# Patient Record
Sex: FEMALE | Race: WHITE | Employment: UNEMPLOYED | URBAN - NONMETROPOLITAN AREA
[De-identification: names, ages, dates, MRNs, and addresses within clinical notes are randomized per-mention and may not be internally consistent; named-entity substitution may affect disease eponyms.]

---

## 2017-10-24 ENCOUNTER — APPOINTMENT (OUTPATIENT)
Dept: GENERAL RADIOLOGY | Age: 57
End: 2017-10-24
Payer: COMMERCIAL

## 2017-10-24 ENCOUNTER — HOSPITAL ENCOUNTER (EMERGENCY)
Age: 57
Discharge: HOME | End: 2017-10-24
Payer: COMMERCIAL

## 2017-10-24 VITALS
WEIGHT: 195 LBS | HEART RATE: 72 BPM | RESPIRATION RATE: 16 BRPM | BODY MASS INDEX: 32.49 KG/M2 | OXYGEN SATURATION: 97 % | SYSTOLIC BLOOD PRESSURE: 135 MMHG | TEMPERATURE: 97.8 F | HEIGHT: 65 IN | DIASTOLIC BLOOD PRESSURE: 70 MMHG

## 2017-10-24 DIAGNOSIS — M25.552 ACUTE PAIN OF LEFT HIP: Primary | ICD-10-CM

## 2017-10-24 PROCEDURE — 99283 EMERGENCY DEPT VISIT LOW MDM: CPT | Performed by: NURSE PRACTITIONER

## 2017-10-24 PROCEDURE — 96372 THER/PROPH/DIAG INJ SC/IM: CPT

## 2017-10-24 PROCEDURE — 6360000002 HC RX W HCPCS: Performed by: NURSE PRACTITIONER

## 2017-10-24 PROCEDURE — 73502 X-RAY EXAM HIP UNI 2-3 VIEWS: CPT

## 2017-10-24 PROCEDURE — 99283 EMERGENCY DEPT VISIT LOW MDM: CPT

## 2017-10-24 RX ORDER — MORPHINE SULFATE 4 MG/ML
4 INJECTION, SOLUTION INTRAMUSCULAR; INTRAVENOUS ONCE
Status: COMPLETED | OUTPATIENT
Start: 2017-10-24 | End: 2017-10-24

## 2017-10-24 RX ORDER — OXYCODONE HYDROCHLORIDE AND ACETAMINOPHEN 5; 325 MG/1; MG/1
1 TABLET ORAL 2 TIMES DAILY PRN
COMMUNITY

## 2017-10-24 RX ORDER — ONDANSETRON 2 MG/ML
4 INJECTION INTRAMUSCULAR; INTRAVENOUS ONCE
Status: COMPLETED | OUTPATIENT
Start: 2017-10-24 | End: 2017-10-24

## 2017-10-24 RX ORDER — KETOROLAC TROMETHAMINE 30 MG/ML
60 INJECTION, SOLUTION INTRAMUSCULAR; INTRAVENOUS ONCE
Status: COMPLETED | OUTPATIENT
Start: 2017-10-24 | End: 2017-10-24

## 2017-10-24 RX ORDER — DULOXETIN HYDROCHLORIDE 60 MG/1
120 CAPSULE, DELAYED RELEASE ORAL NIGHTLY
COMMUNITY

## 2017-10-24 RX ORDER — CYCLOBENZAPRINE HCL 10 MG
10 TABLET ORAL 3 TIMES DAILY PRN
Qty: 30 TABLET | Refills: 0 | Status: SHIPPED | OUTPATIENT
Start: 2017-10-24 | End: 2017-11-03

## 2017-10-24 RX ORDER — ORPHENADRINE CITRATE 30 MG/ML
60 INJECTION INTRAMUSCULAR; INTRAVENOUS ONCE
Status: COMPLETED | OUTPATIENT
Start: 2017-10-24 | End: 2017-10-24

## 2017-10-24 RX ORDER — NAPROXEN 500 MG/1
500 TABLET ORAL 2 TIMES DAILY
Qty: 20 TABLET | Refills: 0 | Status: SHIPPED | OUTPATIENT
Start: 2017-10-24

## 2017-10-24 RX ADMIN — ORPHENADRINE CITRATE 60 MG: 30 INJECTION INTRAMUSCULAR; INTRAVENOUS at 17:24

## 2017-10-24 RX ADMIN — KETOROLAC TROMETHAMINE 60 MG: 30 INJECTION, SOLUTION INTRAMUSCULAR at 17:24

## 2017-10-24 RX ADMIN — ONDANSETRON 4 MG: 2 INJECTION INTRAMUSCULAR; INTRAVENOUS at 17:24

## 2017-10-24 RX ADMIN — MORPHINE SULFATE 4 MG: 4 INJECTION, SOLUTION INTRAMUSCULAR; INTRAVENOUS at 17:27

## 2017-10-24 ASSESSMENT — PAIN SCALES - GENERAL
PAINLEVEL_OUTOF10: 10
PAINLEVEL_OUTOF10: 10
PAINLEVEL_OUTOF10: 0
PAINLEVEL_OUTOF10: 10

## 2017-10-24 ASSESSMENT — PAIN DESCRIPTION - PAIN TYPE: TYPE: ACUTE PAIN

## 2017-10-24 ASSESSMENT — PAIN DESCRIPTION - ORIENTATION: ORIENTATION: LEFT

## 2017-10-24 ASSESSMENT — PAIN DESCRIPTION - LOCATION: LOCATION: HIP

## 2017-10-24 NOTE — ED PROVIDER NOTES
[facility] [place] [facility] EMERGENCY DEPT  eMERGENCY dEPARTMENT eNCOUnter      Pt Name: Angel Luis Ozuna  MRN: [id number]  [place] 1960  Date of evaluation: 10/24/2017  Provider: THANH Juárez    CHIEF COMPLAINT       Chief Complaint   Patient presents with    Hip Pain     Left hip pain - no known injury - pain started Saturday         HISTORY OF PRESENT ILLNESS  (Location/Symptom, Timing/Onset, Context/Setting, Quality, Duration, Modifying Factors, Severity.)   Angel Luis Ozuna is a 56 y.o. female who presents to the emergency department With chief complaint of left hip pain that began Saturday and the patient was eating out of her truck. She states she noticed hip pain when she abducted her left leg. Patient denies any specific injury or trauma other than this activity. Patient states she has degenerative disc disease and she follows up with Dr. Chino Sullivan and she has had previous surgery and she receives injections in her spine. She states she tried to get into the orthopedic institute however she was unable to get through the telephone answering service. She denies any alleviating factors. She states she is prescribed Percocet when necessary however she did not know if this would help her hip. The history is provided by the patient and the spouse. Nursing Notes were reviewed and I agree. REVIEW OF SYSTEMS    (2-9 systems for level 4, 10 or more for level 5)     Review of Systems   Constitutional: Negative for chills and fever. Musculoskeletal: Positive for arthralgias. Negative for gait problem. Skin: Negative for rash. Psychiatric/Behavioral: Behavioral problem: left hip pain. Except as noted above the remainder of the review of systems was reviewed and negative.        PAST MEDICAL HISTORY     Past Medical History:   Diagnosis Date    DDD (degenerative disc disease), lumbar 4/12/2016    Diabetes mellitus ([facility] [address]     GERD (gastroesophageal reflux disease)     Hyperlipidemia     Hypertension     Other spondylosis with radiculopathy, lumbar region 4/12/2016    Radiculopathy, lumbosacral region          SURGICAL HISTORY       Past Surgical History:   Procedure Laterality Date    CARPAL TUNNEL RELEASE Bilateral     CYST REMOVAL Left     DILATION AND CURETTAGE OF UTERUS      X 2    KNEE SURGERY Right     EXPLORATORY    LUMBAR FUSION Left 4/12/2016    L4-5 LUMBAR INTERBODY FUS[facility] [address] [facility] N/A 4/26/2016    L4-5 POSTERIOR SPINAL FUSION WITH INSTRUMENTATION WITH LAMINECTOMY  performed by Dany Casey MD at [address]       Discharge Medication List as of 10/24/2017  5:55 PM      CONTINUE these medications which have NOT CHANGED    Details   DULoxetine (CYMBALTA) 60 MG extended release capsule Take 120 mg by mouth nightlyHistorical Med      oxyCODONE-acetaminophen (PERCOCET) 5-325 MG per tablet Take 1 tablet by mouth 2 times daily as needed for Pain . Historical Med      lidocaine (LIDODERM) Place 1 patch onto the skin nightlyHistorical Med      metFORMIN ER (GLUCOPHAGE-XR) 500 MG XR tablet Take 1,000 mg by mouth 2 times daily      Exenatide 2 MG PEN Inject 2 mg into the skin every 7 days      losartan (COZAAR) 50 MG tablet Take 100 mg by mouth daily      atenolol (TENORMIN) 25 MG tablet Take 25 mg by mouth daily      atorvastatin (LIPITOR) 20 MG tablet Take 20 mg by mouth nightly      traZODone (DESYREL) 50 MG tablet Take 50 mg by mouth nightly CAN TAKE UP  MG AT HS      pregabalin (LYRICA) 50 MG capsule Take 300 mg by mouth 2 times daily Historical Med             ALLERGIES     Lisinopril and Erythromycin    FAMILY HISTORY       Family History   Problem Relation Age of Onset    Cancer Mother      LUNG CA    Cancer Father      LUNG CA    Heart Disease Father     Cancer Sister      LUNG CA    Other Brother      ETOH ABUSE          SOCIAL HISTORY       Social History     Social History    Marital status:      Spouse name: N/A    Number of children: N/A    Years of education: N/A     Social History Main Topics    Smoking status: Never Smoker    Smokeless tobacco: Never Used    Alcohol use No    Drug use: No    Sexual activity: Not Asked     Other Topics Concern    None     Social History Narrative    None       SCREENINGS           PHYSICAL EXAM    (up to 7 for level 4, 8 or more for level 5)     ED Triage Vitals   BP Temp Temp Source Pulse Resp SpO2 Height Weight   10/24/17 1643 10/24/17 1643 10/24/17 1643 10/24/17 1643 10/24/17 1643 10/24/17 1643 10/24/17 1636 10/24/17 1636   113/70 98 °F (36.7 °C) Oral 71 18 99 % 5' 5\" (1.651 m) 195 lb (88.5 kg)       Physical Exam   Constitutional: She is oriented to person, place, and time. She appears well-developed and well-nourished. No distress. HENT:   Head: Normocephalic and atraumatic. Musculoskeletal: Normal range of motion. She exhibits tenderness. She exhibits no edema or deformity. Legs:  Neurological: She is alert and oriented to person, place, and time. No cranial nerve deficit. Coordination normal.   Skin: Skin is warm and dry. She is not diaphoretic. Psychiatric: She has a normal mood and affect. Nursing note and vitals reviewed. DIAGNOSTIC RESULTS     RADIOLOGY:   Non-plain film images such as CT, Ultrasound and MRI are read by the radiologist. Plain radiographic images are visualized and preliminarily interpreted by No att. providers found with the below findings:        Interpretation per the Radiologist below, if available at the time of this note:    XR HIP 2-3 VW W PELVIS LEFT   Final Result   1. No acute bony abnormality. 2. Degenerative changes. Signed by Dr Bhargavi Guido on 10/24/2017 5:22 PM          LABS:  Labs Reviewed - No data to display    All other labs were within normal range or not returned as of this dictation.     RE-ASSESSMENT          EMERGENCY DEPARTMENT COURSE and DIFFERENTIAL DIAGNOSIS/MDM: Vitals:    Vitals:    10/24/17 1636 10/24/17 1643 10/24/17 1806   BP:  113/70 135/70   Pulse:  71 72   Resp:  18 16   Temp:  98 °F (36.7 °C) 97.8 °F (36.6 °C)   TempSrc:  Oral    SpO2:  99% 97%   Weight: 195 lb (88.5 kg)     Height: 5' 5\" (1.651 m)             MDM  Number of Diagnoses or Management Options  Diagnosis management comments: Briefly, the patient presents to the ED today with left hip pain. Her x-rays today are unremarkable for any acute bony abnormality. My differential diagnoses consisted of trochanter bursitis which we discussed in depth. The patient tells me she takes Percocets when necessary I have encouraged her to get on these routinely and we will implement anti-inflammatories and muscle relaxants. The patient will follow-up with the orthopedic institute as she sees Dr. Woody Painting for her back pain and of her hip pain continues she will discuss this with him. PROCEDURES:    Procedures      FINAL IMPRESSION      1.  Acute pain of left hip          DISPOSITION/PLAN   DISPOSITION     PATIENT REFERRED TO:  Madhavi Thompson MD  [address] [place] [address]  [phone]      If symptoms worsen      DISCHARGE MEDICATIONS:  Discharge Medication List as of 10/24/2017  5:55 PM      START taking these medications    Details   naproxen (NAPROSYN) 500 MG tablet Take 1 tablet by mouth 2 times daily, Disp-20 tablet, R-0Print      cyclobenzaprine (FLEXERIL) 10 MG tablet Take 1 tablet by mouth 3 times daily as needed for Muscle spasms, Disp-30 tablet, R-0Print             (Please note that portions of this note were completed with a voice recognition program.  Efforts were made to edit the dictations but occasionally words are mis-transcribed.)    Green January, APRN       Zi January, APRN  10/25/17 0443